# Patient Record
Sex: FEMALE | Race: WHITE | URBAN - METROPOLITAN AREA
[De-identification: names, ages, dates, MRNs, and addresses within clinical notes are randomized per-mention and may not be internally consistent; named-entity substitution may affect disease eponyms.]

---

## 2017-01-17 ENCOUNTER — OFFICE VISIT (OUTPATIENT)
Dept: FAMILY MEDICINE | Facility: CLINIC | Age: 32
End: 2017-01-17
Payer: COMMERCIAL

## 2017-01-17 VITALS
WEIGHT: 188 LBS | BODY MASS INDEX: 32.1 KG/M2 | DIASTOLIC BLOOD PRESSURE: 93 MMHG | HEART RATE: 101 BPM | SYSTOLIC BLOOD PRESSURE: 130 MMHG | HEIGHT: 64 IN | TEMPERATURE: 98.2 F

## 2017-01-17 DIAGNOSIS — M79.651 RIGHT THIGH PAIN: Primary | ICD-10-CM

## 2017-01-17 DIAGNOSIS — R51.9 FACIAL PAIN: ICD-10-CM

## 2017-01-17 DIAGNOSIS — Z00.00 ANNUAL VISIT FOR GENERAL ADULT MEDICAL EXAMINATION WITHOUT ABNORMAL FINDINGS: Primary | ICD-10-CM

## 2017-01-17 DIAGNOSIS — F32.1 MODERATE SINGLE CURRENT EPISODE OF MAJOR DEPRESSIVE DISORDER (H): ICD-10-CM
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
## 2017-01-17 LAB
ERYTHROCYTE [DISTWIDTH] IN BLOOD BY AUTOMATED COUNT: 12 % (ref 10–15)
HCT VFR BLD AUTO: 40.6 % (ref 35–47)
HGB BLD-MCNC: 13.8 G/DL (ref 11.7–15.7)
MCH RBC QN AUTO: 31.1 PG (ref 26.5–33)
MCHC RBC AUTO-ENTMCNC: 34 G/DL (ref 31.5–36.5)
MCV RBC AUTO: 91 FL (ref 78–100)
PLATELET # BLD AUTO: 308 10E9/L (ref 150–450)
RBC # BLD AUTO: 4.44 10E12/L (ref 3.8–5.2)
T3 SERPL-MCNC: 130 NG/DL (ref 60–181)
WBC # BLD AUTO: 5.7 10E9/L (ref 4–11)

## 2017-01-17 PROCEDURE — 99385 PREV VISIT NEW AGE 18-39: CPT | Performed by: INTERNAL MEDICINE

## 2017-01-17 PROCEDURE — 84480 ASSAY TRIIODOTHYRONINE (T3): CPT | Performed by: INTERNAL MEDICINE

## 2017-01-17 PROCEDURE — 86376 MICROSOMAL ANTIBODY EACH: CPT | Performed by: INTERNAL MEDICINE

## 2017-01-17 PROCEDURE — 84443 ASSAY THYROID STIM HORMONE: CPT | Performed by: INTERNAL MEDICINE

## 2017-01-17 PROCEDURE — 82306 VITAMIN D 25 HYDROXY: CPT | Performed by: INTERNAL MEDICINE

## 2017-01-17 PROCEDURE — 36415 COLL VENOUS BLD VENIPUNCTURE: CPT | Performed by: INTERNAL MEDICINE

## 2017-01-17 PROCEDURE — 84439 ASSAY OF FREE THYROXINE: CPT | Performed by: INTERNAL MEDICINE

## 2017-01-17 PROCEDURE — 99213 OFFICE O/P EST LOW 20 MIN: CPT | Performed by: INTERNAL MEDICINE

## 2017-01-17 PROCEDURE — 99212 OFFICE O/P EST SF 10 MIN: CPT | Mod: 25 | Performed by: INTERNAL MEDICINE

## 2017-01-17 PROCEDURE — 85027 COMPLETE CBC AUTOMATED: CPT | Performed by: INTERNAL MEDICINE

## 2017-01-17 RX ORDER — FLUOXETINE 10 MG/1
10 CAPSULE ORAL DAILY
Qty: 60 CAPSULE | Refills: 1 | Status: SHIPPED | OUTPATIENT
Start: 2017-01-17 | End: 2017-10-04

## 2017-01-17 ASSESSMENT — ANXIETY QUESTIONNAIRES
GAD7 TOTAL SCORE: 10
3. WORRYING TOO MUCH ABOUT DIFFERENT THINGS: MORE THAN HALF THE DAYS
5. BEING SO RESTLESS THAT IT IS HARD TO SIT STILL: SEVERAL DAYS
7. FEELING AFRAID AS IF SOMETHING AWFUL MIGHT HAPPEN: SEVERAL DAYS
IF YOU CHECKED OFF ANY PROBLEMS ON THIS QUESTIONNAIRE, HOW DIFFICULT HAVE THESE PROBLEMS MADE IT FOR YOU TO DO YOUR WORK, TAKE CARE OF THINGS AT HOME, OR GET ALONG WITH OTHER PEOPLE: SOMEWHAT DIFFICULT
1. FEELING NERVOUS, ANXIOUS, OR ON EDGE: SEVERAL DAYS
2. NOT BEING ABLE TO STOP OR CONTROL WORRYING: MORE THAN HALF THE DAYS
6. BECOMING EASILY ANNOYED OR IRRITABLE: MORE THAN HALF THE DAYS

## 2017-01-17 ASSESSMENT — PATIENT HEALTH QUESTIONNAIRE - PHQ9: 5. POOR APPETITE OR OVEREATING: SEVERAL DAYS

## 2017-01-17 NOTE — MR AVS SNAPSHOT
After Visit Summary   1/17/2017    Lyla Navarrete    MRN: [id number]           Patient Information     Date Of Birth          1985        Visit Information        Provider Department      1/17/2017 3:00 PM Marlyn Adams MD [facility]        Today's Diagnoses     Annual visit for general adult medical examination without abnormal findings    -  1     Moderate single current episode of major depressive disorder (H)           Care Instructions      Preventive Health Recommendations  Female Ages 26 - 39  Yearly exam:   See your health care provider every year in order to    Review health changes.     Discuss preventive care.      Review your medicines if you your doctor has prescribed any.    Until age 30: Get a Pap test every three years (more often if you have had an abnormal result).    After age 30: Talk to your doctor about whether you should have a Pap test every 3 years or have a Pap test with HPV screening every 5 years.   You do not need a Pap test if your uterus was removed (hysterectomy) and you have not had cancer.  You should be tested each year for STDs (sexually transmitted diseases), if you're at risk.   Talk to your provider about how often to have your cholesterol checked.  If you are at risk for diabetes, you should have a diabetes test (fasting glucose).  Shots: Get a flu shot each year. Get a tetanus shot every 10 years.   Nutrition:     Eat at least 5 servings of fruits and vegetables each day.    Eat whole-grain bread, whole-wheat pasta and brown rice instead of white grains and rice.    Talk to your provider about Calcium and Vitamin D.     Lifestyle    Exercise at least 150 minutes a week (30 minutes a day, 5 days of the week). This will help you control your weight and prevent disease.    Limit alcohol to one drink per day.    No smoking.     Wear sunscreen to prevent skin cancer.    See your dentist every six months for an exam and cleaning.             "Follow-ups after your visit        Who to contact     If you have questions or need follow up information about today's clinic visit or your schedule please contact [facility] [place] directly at [phone].  Normal or non-critical lab and imaging results will be communicated to you by MyChart, letter or phone within 4 business days after the clinic has received the results. If you do not hear from us within 7 days, please contact the clinic through MyChart or phone. If you have a critical or abnormal lab result, we will notify you by phone as soon as possible.  Submit refill requests through [employer] or call your pharmacy and they will forward the refill request to us. Please allow 3 business days for your refill to be completed.          Additional Information About Your Visit        MyCharSTYLHUNT Information     Drizly lets you send messages to your doctor, view your test results, renew your prescriptions, schedule appointments and more. To sign up, go to [email or web address] . Click on \"Log in\" on the left side of the screen, which will take you to the Welcome page. Then click on \"Sign up Now\" on the right side of the page.     You will be asked to enter the access code listed below, as well as some personal information. Please follow the directions to create your username and password.     Your access code is: [email or web address]  Expires: 2017  3:40 PM     Your access code will  in 90 days. If you need help or a new code, please call your [place] clinic or [phone].        Care EveryWhere ID     This is your Care EveryWhere ID. This could be used by other organizations to access your [place] medical records  [id number]        Your Vitals Were     Pulse Temperature Height BMI (Body Mass Index) Last Period       101 98.2  F (36.8  C) (Oral) 5' 3.78\" (1.62 m) 32.49 kg/m2 2017        Blood Pressure from Last 3 Encounters:   17 130/93    Weight from Last 3 Encounters:   17 " 188 lb (85.276 kg)              We Performed the Following     CBC with platelets     T3, total     T4, free     Thyroid peroxidase antibody     TSH with free T4 reflex     Vitamin D Deficiency          Today's Medication Changes          These changes are accurate as of: 1/17/17  3:40 PM.  If you have any questions, ask your nurse or doctor.               Start taking these medicines.        Dose/Directions    FLUoxetine 10 MG capsule   Commonly known as:  PROzac   Used for:  Moderate single current episode of major depressive disorder (H)   Started by:  Marlyn Adams MD        Dose:  10 mg   Take 1 capsule (10 mg) by mouth daily X 14 days and then increase to 2 capsules (20 mg) daily.   Quantity:  60 capsule   Refills:  1            Where to get your medicines      These medications were [place] [facility] [place]  [address] [facility] [address]     Phone:  [phone]    - FLUoxetine 10 MG capsule             Primary Care Provider    None Specified       No primary provider on file.        Thank you!     Thank you for choosing [facility]  for your care. Our goal is always to provide you with excellent care. Hearing back from our patients is one way we can continue to improve our services. Please take a few minutes to complete the written survey that you may receive in the mail after your visit with us. Thank you!             Your Updated Medication List - Protect others around you: Learn how to safely use, store and throw away your medicines at www.disposemymeds.org.          This list is accurate as of: 1/17/17  3:40 PM.  Always use your most recent med list.                   Brand Name Dispense Instructions for use    DIAZEPAM PO      Take 5 mg by mouth       FLUoxetine 10 MG capsule    PROzac    60 capsule    Take 1 capsule (10 mg) by mouth daily X 14 days and then increase to 2 capsules (20 mg) daily.        PERCOCET PO

## 2017-01-17 NOTE — PROGRESS NOTES
SUBJECTIVE:     CC: Lyla Navarrete is an 31 year old woman who presents for preventive health visit.     Healthy Habits:    Do you get at least three servings of calcium containing foods daily (dairy, green leafy vegetables, etc.)? yes    Amount of exercise or daily activities, outside of work:  Sometimes     Problems taking medications regularly No    Medication side effects: No    Have you had an eye exam in the past two years? no    Do you see a dentist twice per year? yes  Do you have sleep apnea, excessive snoring or daytime drowsiness?no  Struggles with some depression and anxiety. Mom takes Zoloft. At least for the past year she has been struggling with a depressed mood. Her brother passed away 2 years ago.      Today's PHQ-2 Score:   PHQ-2 ( 1999 Pfizer) 1/17/2017   Q1: Little interest or pleasure in doing things 2   Q2: Feeling down, depressed or hopeless 2   PHQ-2 Score 4       Abuse: Current or Past(Physical, Sexual or Emotional)- No  Do you feel safe in your environment - Yes    Social History   Substance Use Topics     Smoking status: Never Smoker      Smokeless tobacco: Never Used     Alcohol Use: No     The patient does not drink >3 drinks per day nor >7 drinks per week.    No results for input(s): CHOL, HDL, LDL, TRIG, CHOLHDLRATIO, NHDL in the last 36238 hours.    Reviewed orders with patient.  Reviewed health maintenance and updated orders accordingly - Yes    Mammo Decision Support:  Mammogram not appropriate for this patient based on age.    Pertinent mammograms are reviewed under the imaging tab.  History of abnormal Pap smear: NO - age 21-29 PAP every 3 years recommended  All Histories reviewed and updated in Epic.      ROS:   ROS: 10 point ROS neg other than the symptoms noted above in the HPI.      Problem list, Medication list, Allergies, and Medical/Social/Surgical histories reviewed in [facility] and updated as appropriate.  OBJECTIVE:     /92 mmHg  Pulse 59  Temp(Src) 98.2  F (36.8  " C) (Oral)  Ht 5' 3.78\" (1.62 m)  Wt 188 lb (85.276 kg)  BMI 32.49 kg/m2  LMP 01/14/2017  EXAM:  GENERAL: healthy, alert and no distress  EYES: Eyes grossly normal to inspection, PERRL and conjunctivae and sclerae normal  HENT: ear canals and TM's normal, nose and mouth without ulcers or lesions  NECK: no adenopathy, no asymmetry, masses, or scars and thyroid normal to palpation  RESP: lungs clear to auscultation - no rales, rhonchi or wheezes  BREAST: normal without masses, tenderness or nipple discharge and no palpable axillary masses or adenopathy  CV: regular rate and rhythm, normal S1 S2, no S3 or S4, no murmur, click or rub, no peripheral edema and peripheral pulses strong  ABDOMEN: soft, nontender, no hepatosplenomegaly, no masses and bowel sounds normal  MS: no gross musculoskeletal defects noted, no edema  SKIN: no suspicious lesions or rashes  NEURO: Normal strength and tone, mentation intact and speech normal  PSYCH: mentation appears normal, affect normal/bright    ASSESSMENT/PLAN:     1. Annual visit for general adult medical examination without abnormal findings  - CBC with platelets    2. Moderate single current episode of major depressive disorder (H)  - FLUoxetine (PROZAC) 10 MG capsule; Take 1 capsule (10 mg) by mouth daily X 14 days and then increase to 2 capsules (20 mg) daily.  Dispense: 60 capsule; Refill: 1  - Follow up in 4 weeks.   - TSH with free T4 reflex  - Vitamin D Deficiency  - T4, free  - T3, total  - Thyroid peroxidase antibody  - CBC with platelets      COUNSELING:   Reviewed preventive health counseling, as reflected in patient instructions       Regular exercise       Healthy diet/nutrition       Vision screening       Hearing screening       Contraception       Family planning       Osteoporosis Prevention/Bone Health         reports that she has never smoked. She has never used smokeless tobacco.    Estimated body mass index is 32.49 kg/(m^2) as calculated from the " "following:    Height as of this encounter: 5' 3.78\" (1.62 m).    Weight as of this encounter: 188 lb (85.276 kg).   Weight management plan: Discussed healthy diet and exercise guidelines and patient will follow up in 3 months in clinic to re-evaluate.    Counseling Resources:  ATP IV Guidelines  Pooled Cohorts Equation Calculator  Breast Cancer Risk Calculator  FRAX Risk Assessment  ICSI Preventive Guidelines  Dietary Guidelines for Americans, 2010  USDA's MyPlate  ASA Prophylaxis  Lung CA Screening    Marlyn Adams MD  [facility] [place] [facility]  "

## 2017-01-17 NOTE — Clinical Note
Please abstract the following data from this visit with this patient into the appropriate field in Epic:  Pap smear done on this date: 10/2015 (approximately), by this group: [facility]  results were normal .

## 2017-01-18 LAB
DEPRECATED CALCIDIOL+CALCIFEROL SERPL-MC: 26 UG/L (ref 20–75)
T4 FREE SERPL-MCNC: 1.06 NG/DL (ref 0.76–1.46)
TSH SERPL DL<=0.005 MIU/L-ACNC: 2.33 MU/L (ref 0.4–4)

## 2017-01-18 ASSESSMENT — ANXIETY QUESTIONNAIRES: GAD7 TOTAL SCORE: 10

## 2017-01-18 ASSESSMENT — PATIENT HEALTH QUESTIONNAIRE - PHQ9: SUM OF ALL RESPONSES TO PHQ QUESTIONS 1-9: 16

## 2017-01-19 LAB — THYROPEROXIDASE AB SERPL-ACNC: <10 IU/ML

## 2017-01-27 VITALS
DIASTOLIC BLOOD PRESSURE: 92 MMHG | BODY MASS INDEX: 32.1 KG/M2 | SYSTOLIC BLOOD PRESSURE: 132 MMHG | HEART RATE: 59 BPM | WEIGHT: 188 LBS | HEIGHT: 64 IN | TEMPERATURE: 98.2 F
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
## 2017-02-21 ENCOUNTER — TELEPHONE (OUTPATIENT)
Dept: FAMILY MEDICINE | Facility: CLINIC | Age: 32
End: 2017-02-21

## 2017-02-21 NOTE — TELEPHONE ENCOUNTER
Attending Physician's Report from [employer]  Placed in MD bin for review and signature  CASI came with to be mailed back to [employer]  Nuha Madrigal TC

## 2017-03-01 NOTE — TELEPHONE ENCOUNTER
Forms signed by Marlyn Adams MD   and they were faxed to Beulah Pacheco  [phone] (JULIANEE was not with form when TC got it back) on March 1, 2017  Copy Sent to abstracting for scanning.  Nuha CHAVES

## 2017-10-04 ENCOUNTER — OFFICE VISIT (OUTPATIENT)
Dept: FAMILY MEDICINE | Facility: CLINIC | Age: 32
End: 2017-10-04
Payer: COMMERCIAL

## 2017-10-04 VITALS
OXYGEN SATURATION: 100 % | HEART RATE: 80 BPM | TEMPERATURE: 98.7 F | WEIGHT: 184 LBS | SYSTOLIC BLOOD PRESSURE: 118 MMHG | DIASTOLIC BLOOD PRESSURE: 76 MMHG | BODY MASS INDEX: 31.41 KG/M2 | HEIGHT: 64 IN

## 2017-10-04 DIAGNOSIS — Z12.4 SCREENING FOR CERVICAL CANCER: Primary | ICD-10-CM

## 2017-10-04 DIAGNOSIS — N89.8 VAGINAL DISCHARGE: ICD-10-CM

## 2017-10-04 LAB
SPECIMEN SOURCE: NORMAL
WET PREP SPEC: NORMAL

## 2017-10-04 PROCEDURE — 87624 HPV HI-RISK TYP POOLED RSLT: CPT | Performed by: PHYSICIAN ASSISTANT

## 2017-10-04 PROCEDURE — G0145 SCR C/V CYTO,THINLAYER,RESCR: HCPCS | Performed by: PHYSICIAN ASSISTANT

## 2017-10-04 PROCEDURE — 99213 OFFICE O/P EST LOW 20 MIN: CPT | Performed by: PHYSICIAN ASSISTANT

## 2017-10-04 PROCEDURE — 87210 SMEAR WET MOUNT SALINE/INK: CPT | Performed by: PHYSICIAN ASSISTANT

## 2017-10-04 NOTE — MR AVS SNAPSHOT
After Visit Summary   10/4/2017    Lyla Navarrete    MRN: [id number]           Patient Information     Date Of Birth          1985        Visit Information        Provider Department      10/4/2017 10:40 AM Rianna Proctor PA-C [facility] [place]        Today's Diagnoses     Screening for cervical cancer    -  1    Vaginal discharge          Care Instructions      Preventive Health Recommendations  Female Ages 26 - 39  Yearly exam:   See your health care provider every year in order to    Review health changes.     Discuss preventive care.      Review your medicines if you your doctor has prescribed any.    Until age 30: Get a Pap test every three years (more often if you have had an abnormal result).    After age 30: Talk to your doctor about whether you should have a Pap test every 3 years or have a Pap test with HPV screening every 5 years.   You do not need a Pap test if your uterus was removed (hysterectomy) and you have not had cancer.  You should be tested each year for STDs (sexually transmitted diseases), if you're at risk.   Talk to your provider about how often to have your cholesterol checked.  If you are at risk for diabetes, you should have a diabetes test (fasting glucose).  Shots: Get a flu shot each year. Get a tetanus shot every 10 years.   Nutrition:     Eat at least 5 servings of fruits and vegetables each day.    Eat whole-grain bread, whole-wheat pasta and brown rice instead of white grains and rice.    Talk to your provider about Calcium and Vitamin D.     Lifestyle    Exercise at least 150 minutes a week (30 minutes a day, 5 days of the week). This will help you control your weight and prevent disease.    Limit alcohol to one drink per day.    No smoking.     Wear sunscreen to prevent skin cancer.    See your dentist every six months for an exam and cleaning.            Follow-ups after your visit        Who to contact     If you have questions or need follow up  "information about today's clinic visit or your schedule please contact [facility] directly at [phone].  Normal or non-critical lab and imaging results will be communicated to you by MyChart, letter or phone within 4 business days after the clinic has received the results. If you do not hear from us within 7 days, please contact the clinic through [facility] or phone. If you have a critical or abnormal lab result, we will notify you by phone as soon as possible.  Submit refill requests through [employer] or call your pharmacy and they will forward the refill request to us. Please allow 3 business days for your refill to be completed.          Additional Information About Your Visit        MyChart Information     [employer] lets you send messages to your doctor, view your test results, renew your prescriptions, schedule appointments and more. To sign up, go to [email or web address] . Click on \"Log in\" on the left side of the screen, which will take you to the Welcome page. Then click on \"Sign up Now\" on the right side of the page.     You will be asked to enter the access code listed below, as well as some personal information. Please follow the directions to create your username and password.     Your access code is: [email or web address]  Expires: 1/3/2018  4:48 PM     Your access code will  in 90 days. If you need help or a new code, please call your [place] clinic or [phone].        Care EveryWhere ID     This is your Care EveryWhere ID. This could be used by other organizations to access your [place] medical records  [id number]        Your Vitals Were     Pulse Temperature Height Last Period Pulse Oximetry Breastfeeding?    80 98.7  F (37.1  C) (Oral) 5' 3.78\" (1.62 m) 2017 (Approximate) 100% No    BMI (Body Mass Index)                   31.8 kg/m2            Blood Pressure from Last 3 Encounters:   10/04/17 118/76   17 (!) 130/93   17 (!) 132/92    Weight from Last 3 " Encounters:   10/04/17 184 lb (83.5 kg)   01/17/17 188 lb (85.3 kg)   01/27/17 188 lb (85.3 kg)              We Performed the Following     HPV High Risk Types DNA Cervical     Pap imaged thin layer screen with HPV - recommended age 30 - 65 years (select HPV order below)     Wet prep        Primary Care Provider Office Phone # Fax #    Marlyn Adams [phone]       [address]  [place] [address]        Equal Access to Services     [facility]: Hadii aad ku hadasho Soomaali, waaxda luqadaha, qaybta kaalmada adeegyada, waxjúnior muir . So [facility] [phone].    ATENCIÓN: Si habla español, tiene a urrutia disposición servicios gratuitos de asistencia lingüística. Llame al [phone].    We comply with applicable federal civil rights laws and [place] laws. We do not discriminate on the basis of race, color, national origin, age, disability, sex, sexual orientation, or gender identity.            Thank you!     Thank you for choosing [facility]  for your care. Our goal is always to provide you with excellent care. Hearing back from our patients is one way we can continue to improve our services. Please take a few minutes to complete the written survey that you may receive in the mail after your visit with us. Thank you!             Your Updated Medication List - Protect others around you: Learn how to safely use, store and throw away your medicines at www.disposemymeds.org.      Notice  As of 10/4/2017 11:59 PM    You have not been prescribed any medications.

## 2017-10-04 NOTE — LETTER
October 13, 2017    Lyla Navarrete  [address] [address]    Dear Lyla,  We are happy to inform you that your PAP smear result from 10/04/17 is normal.  We are now able to do a follow up test on PAP smears. The DNA test is for HPV (Human Papilloma Virus). Cervical cancer is closely linked with certain types of HPV. Your result showed no evidence of high risk HPV.  Therefore we recommend you return in 3 years for your next pap smear.  You will still need to return to the clinic every year for an annual exam and other preventive tests.  Please contact the clinic at [phone] with any questions.  Sincerely,    Rianna Proctor PA-C/[place]

## 2017-10-04 NOTE — PROGRESS NOTES
Note to staff: Please send a result letter    The results from your recent lab work are within normal limits.    - The vaginal wet prep was normal       Thank you for choosing [facility] for your health care needs,      Rianna Proctor PA-C

## 2017-10-04 NOTE — PROGRESS NOTES
"  SUBJECTIVE:                                                    Lyla Navarrete is a 32 year old female who presents to clinic today for the following health issues:      Pt here for Pap only -- states last was 3 years ago at an Urgent Care.     Patient denies any concerns today.  She is trying to get pregnant and they have been trying since March 2017.      Problem list and histories reviewed & adjusted, as indicated.  Additional history: as documented      ROS:  Constitutional, HEENT, cardiovascular, pulmonary, GI, , musculoskeletal, neuro, skin, endocrine and psych systems are negative, except as otherwise noted.    OBJECTIVE:                                                    /76 (BP Location: Left arm, Patient Position: Chair, Cuff Size: Adult Regular)  Pulse 80  Temp 98.7  F (37.1  C) (Oral)  Ht 5' 3.78\" (1.62 m)  Wt 184 lb (83.5 kg)  LMP 09/07/2017 (Approximate)  SpO2 100%  Breastfeeding? No  BMI 31.8 kg/m2  Body mass index is 31.8 kg/(m^2).  GENERAL: healthy, alert and no distress  EYES: Eyes grossly normal to inspection, PERRL and conjunctivae and sclerae normal  ABDOMEN: soft, nontender, no hepatosplenomegaly, no masses and bowel sounds normal   (female): normal female external genitalia, normal urethral meatus, vaginal mucosa, normal cervix/adnexa/uterus without masses or discharge  MS: no gross musculoskeletal defects noted, no edema  NEURO: Normal strength and tone, mentation intact and speech normal  PSYCH: mentation appears normal, affect normal/bright    Diagnostic Test Results:  Results for orders placed or performed in visit on 10/04/17   Wet prep   Result Value Ref Range    Specimen Description Vagina     Wet Prep No clue cells seen     Wet Prep No yeast seen     Wet Prep No Trichomonas seen         ASSESSMENT/PLAN:                                                      Lyla was seen today for gyn exam.    Diagnoses and all orders for this visit:    Screening for cervical cancer  - "     Pap imaged thin layer screen with HPV - recommended age 30 - 65 years (select HPV order below)  -     HPV High Risk Types DNA Cervical    Vaginal discharge  -     Wet prep      - Patient advised to followup with any concerns regarding trying to conceive.      -- Patient agrees with and understands the plan today.      See Patient Instructions        Rianna Proctor PA-C    [facility] [place]

## 2017-10-04 NOTE — NURSING NOTE
"Chief Complaint   Patient presents with     Gyn Exam       Initial /76 (BP Location: Left arm, Patient Position: Chair, Cuff Size: Adult Regular)  Pulse 80  Temp 98.7  F (37.1  C) (Oral)  Ht 5' 3.78\" (1.62 m)  Wt 184 lb (83.5 kg)  LMP 09/07/2017 (Approximate)  SpO2 100%  Breastfeeding? No  BMI 31.8 kg/m2 Estimated body mass index is 31.8 kg/(m^2) as calculated from the following:    Height as of this encounter: 5' 3.78\" (1.62 m).    Weight as of this encounter: 184 lb (83.5 kg).  Medication Reconciliation: complete   Csaba Mlnarik CMA    "

## 2017-10-04 NOTE — LETTER
[address] [place] [address]                  [phone]   October 4, 2017    Lyla Navarrete  [address] [facility] [address]      Dear Lyla,    Here is a summary of your recent test results:    - The vaginal wet prep was normal       Your test results are enclosed.      Please contact me if you have any questions.    In addition, here is a list of due or overdue Health Maintenance reminders.    Health Maintenance Due   Topic Date Due     Tetanus Vaccine - every 10 years  09/11/2005     Flu Vaccine - yearly  09/01/2017       Please call us at [phone] (or use "[employer] to address the above recommendations.            Thank you very much for trusting [facility]..     Healthy regards,      Rianna Proctor PA-C        Results for orders placed or performed in visit on 10/04/17   Wet prep   Result Value Ref Range    Specimen Description Vagina     Wet Prep No clue cells seen     Wet Prep No yeast seen     Wet Prep No Trichomonas seen

## 2017-10-06 LAB
COPATH REPORT: NORMAL
PAP: NORMAL

## 2017-10-10 LAB
FINAL DIAGNOSIS: NORMAL
HPV HR 12 DNA CVX QL NAA+PROBE: NEGATIVE
HPV16 DNA SPEC QL NAA+PROBE: NEGATIVE
HPV18 DNA SPEC QL NAA+PROBE: NEGATIVE
SPECIMEN DESCRIPTION: NORMAL

## 2017-10-31 DIAGNOSIS — Z32.01 PREGNANCY TEST POSITIVE: Primary | ICD-10-CM

## 2017-11-09 ENCOUNTER — PRENATAL OFFICE VISIT (OUTPATIENT)
Dept: NURSING | Facility: CLINIC | Age: 32
End: 2017-11-09
Payer: COMMERCIAL

## 2017-11-09 DIAGNOSIS — Z32.01 PREGNANCY TEST POSITIVE: Primary | ICD-10-CM

## 2017-11-09 LAB
ABO + RH BLD: NORMAL
ABO + RH BLD: NORMAL
BETA HCG QUAL IFA URINE: POSITIVE
BLD GP AB SCN SERPL QL: NORMAL
BLOOD BANK CMNT PATIENT-IMP: NORMAL
ERYTHROCYTE [DISTWIDTH] IN BLOOD BY AUTOMATED COUNT: 12.4 % (ref 10–15)
HCT VFR BLD AUTO: 36.1 % (ref 35–47)
HGB BLD-MCNC: 12 G/DL (ref 11.7–15.7)
MCH RBC QN AUTO: 31.2 PG (ref 26.5–33)
MCHC RBC AUTO-ENTMCNC: 33.2 G/DL (ref 31.5–36.5)
MCV RBC AUTO: 94 FL (ref 78–100)
PLATELET # BLD AUTO: 263 10E9/L (ref 150–450)
RBC # BLD AUTO: 3.85 10E12/L (ref 3.8–5.2)
SPECIMEN EXP DATE BLD: NORMAL
WBC # BLD AUTO: 6.3 10E9/L (ref 4–11)

## 2017-11-09 PROCEDURE — 86900 BLOOD TYPING SEROLOGIC ABO: CPT | Performed by: OBSTETRICS & GYNECOLOGY

## 2017-11-09 PROCEDURE — 86850 RBC ANTIBODY SCREEN: CPT | Performed by: OBSTETRICS & GYNECOLOGY

## 2017-11-09 PROCEDURE — 99207 ZZC NO CHARGE NURSE ONLY: CPT

## 2017-11-09 PROCEDURE — 87086 URINE CULTURE/COLONY COUNT: CPT | Performed by: OBSTETRICS & GYNECOLOGY

## 2017-11-09 PROCEDURE — 87389 HIV-1 AG W/HIV-1&-2 AB AG IA: CPT | Performed by: OBSTETRICS & GYNECOLOGY

## 2017-11-09 PROCEDURE — 87340 HEPATITIS B SURFACE AG IA: CPT | Performed by: OBSTETRICS & GYNECOLOGY

## 2017-11-09 PROCEDURE — 36415 COLL VENOUS BLD VENIPUNCTURE: CPT | Performed by: OBSTETRICS & GYNECOLOGY

## 2017-11-09 PROCEDURE — 86901 BLOOD TYPING SEROLOGIC RH(D): CPT | Performed by: OBSTETRICS & GYNECOLOGY

## 2017-11-09 PROCEDURE — 86762 RUBELLA ANTIBODY: CPT | Performed by: OBSTETRICS & GYNECOLOGY

## 2017-11-09 PROCEDURE — 85027 COMPLETE CBC AUTOMATED: CPT | Performed by: OBSTETRICS & GYNECOLOGY

## 2017-11-09 PROCEDURE — 84703 CHORIONIC GONADOTROPIN ASSAY: CPT | Performed by: OBSTETRICS & GYNECOLOGY

## 2017-11-09 PROCEDURE — 86780 TREPONEMA PALLIDUM: CPT | Performed by: OBSTETRICS & GYNECOLOGY

## 2017-11-09 RX ORDER — PRENATAL VIT/IRON FUM/FOLIC AC 27MG-0.8MG
1 TABLET ORAL DAILY
Qty: 100 TABLET | Refills: 3 | COMMUNITY
Start: 2017-11-09

## 2017-11-09 NOTE — MR AVS SNAPSHOT
"              After Visit Summary   11/9/2017    Lyla Navarrete    MRN: [id number]           Patient Information     Date Of Birth          1985        Visit Information        Provider Department      11/9/2017 9:00 AM RI PRENATAL NURSE [facility]        Today's Diagnoses     Pregnancy test positive    -  1       Follow-ups after your visit        Your next 10 appointments already scheduled     Nov 20, 2017  2:45 PM CST   New Prenatal with Nalini Concepcion, DO   [facility] ([facility])    [address]  [facility] [address]   [phone]              Future tests that were ordered for you today     Open Future Orders        Priority Expected Expires Ordered    US OB <14 Weeks w Transvaginal Single Routine  11/9/2018 11/9/2017            Who to contact     If you have questions or need follow up information about today's clinic visit or your schedule please contact [facility] directly at [phone].  Normal or non-critical lab and imaging results will be communicated to you by MyChart, letter or phone within 4 business days after the clinic has received the results. If you do not hear from us within 7 days, please contact the clinic through [employer] or phone. If you have a critical or abnormal lab result, we will notify you by phone as soon as possible.  Submit refill requests through [employer] or call your pharmacy and they will forward the refill request to us. Please allow 3 business days for your refill to be completed.          Additional Information About Your Visit        [employer] Information     [employer] lets you send messages to your doctor, view your test results, renew your prescriptions, schedule appointments and more. To sign up, go to [email or web address] . Click on \"Log in\" on the left side of the screen, which will take you to the Welcome page. Then click on \"Sign up Now\" on the right side of the page.     You will " be asked to enter the access code listed below, as well as some personal information. Please follow the directions to create your username and password.     Your access code is: [email or web address]  Expires: 1/3/2018  3:48 PM     Your access code will  in 90 days. If you need help or a new code, please call your [facility] clinic or [phone].        Care EveryWhere ID     This is your Care EveryWhere ID. This could be used by other organizations to access your [facility] medical records  [id number]        Your Vitals Were     Last Period                   2017 (Exact Date)            Blood Pressure from Last 3 Encounters:   10/04/17 118/76   17 (!) 130/93   17 (!) 132/92    Weight from Last 3 Encounters:   10/04/17 184 lb (83.5 kg)   17 188 lb (85.3 kg)   17 188 lb (85.3 kg)              We Performed the Following     ABO/Rh type and screen     Anti Treponema     Beta HCG qual IFA urine - [facility] and [place]     CBC with platelets     Hepatitis B surface antigen     HIV Antigen Antibody Combo     Rubella Antibody IgG Quantitative     Urine Culture Aerobic Bacterial        Primary Care Provider Office Phone # Fax #    Marlyn Adams [phone]       [address]  [place] [address]        Equal Access to Services     [facility] AH: Hadii aad ku hadasho Soomaali, waaxda luqadaha, qaybta kaalmada adeegyada, kaiser cardona hayjosefa muir . So [facility] [phone].    ATENCIÓN: Si habla español, tiene a urrutia disposición servicios gratuitos de asistencia lingüística. Stanley al [phone].    We comply with applicable federal civil rights laws and [place] laws. We do not discriminate on the basis of race, color, national origin, age, disability, sex, sexual orientation, or gender identity.            Thank you!     Thank you for choosing [facility]  for your care. Our goal is always to provide you with excellent care. Hearing back from  our patients is one way we can continue to improve our services. Please take a few minutes to complete the written survey that you may receive in the mail after your visit with us. Thank you!             Your Updated Medication List - Protect others around you: Learn how to safely use, store and throw away your medicines at www.disposemymeds.org.          This list is accurate as of: 11/9/17 10:25 AM.  Always use your most recent med list.                   Brand Name Dispense Instructions for use Diagnosis    prenatal multivitamin plus iron 27-0.8 MG Tabs per tablet     100 tablet    Take 1 tablet by mouth daily

## 2017-11-09 NOTE — NURSING NOTE
NPN nurse visit. 1st dr visit scheduled for 11/20/17 with Nalini Concepcion D.O Pap due. Last pap 10/2017.  9w1d.   FRANDY Bone RN

## 2017-11-10 LAB
BACTERIA SPEC CULT: NORMAL
HBV SURFACE AG SERPL QL IA: NONREACTIVE
HIV 1+2 AB+HIV1 P24 AG SERPL QL IA: NONREACTIVE
RUBV IGG SERPL IA-ACNC: 31 IU/ML
SPECIMEN SOURCE: NORMAL
T PALLIDUM IGG+IGM SER QL: NEGATIVE

## 2017-11-14 ENCOUNTER — HOSPITAL ENCOUNTER (OUTPATIENT)
Dept: ULTRASOUND IMAGING | Facility: CLINIC | Age: 32
Discharge: HOME | End: 2017-11-14
Attending: OBSTETRICS & GYNECOLOGY | Admitting: OBSTETRICS & GYNECOLOGY
Payer: COMMERCIAL

## 2017-11-14 DIAGNOSIS — Z32.01 PREGNANCY TEST POSITIVE: ICD-10-CM

## 2017-11-14 PROCEDURE — 76801 OB US < 14 WKS SINGLE FETUS: CPT

## 2017-11-20 ENCOUNTER — PRENATAL OFFICE VISIT (OUTPATIENT)
Dept: OBGYN | Facility: CLINIC | Age: 32
End: 2017-11-20
Payer: COMMERCIAL

## 2017-11-20 VITALS
WEIGHT: 188 LBS | SYSTOLIC BLOOD PRESSURE: 110 MMHG | HEIGHT: 63 IN | DIASTOLIC BLOOD PRESSURE: 70 MMHG | BODY MASS INDEX: 33.31 KG/M2 | HEART RATE: 84 BPM

## 2017-11-20 DIAGNOSIS — Z34.01 ENCOUNTER FOR SUPERVISION OF NORMAL FIRST PREGNANCY IN FIRST TRIMESTER: Primary | ICD-10-CM

## 2017-11-20 DIAGNOSIS — Z11.3 SCREEN FOR STD (SEXUALLY TRANSMITTED DISEASE): ICD-10-CM

## 2017-11-20 DIAGNOSIS — Z87.09 HISTORY OF ASTHMA: ICD-10-CM

## 2017-11-20 DIAGNOSIS — Z87.42 H/O ABNORMAL CERVICAL PAPANICOLAOU SMEAR: ICD-10-CM

## 2017-11-20 PROCEDURE — 40000791 ZZHCL STATISTIC VERIFI PRENATAL TRISOMY 21,18,13: Mod: 90 | Performed by: OBSTETRICS & GYNECOLOGY

## 2017-11-20 PROCEDURE — 99207 ZZC FIRST OB VISIT: CPT | Performed by: OBSTETRICS & GYNECOLOGY

## 2017-11-20 PROCEDURE — 87591 N.GONORRHOEAE DNA AMP PROB: CPT | Performed by: OBSTETRICS & GYNECOLOGY

## 2017-11-20 PROCEDURE — 87491 CHLMYD TRACH DNA AMP PROBE: CPT | Performed by: OBSTETRICS & GYNECOLOGY

## 2017-11-20 PROCEDURE — 99000 SPECIMEN HANDLING OFFICE-LAB: CPT | Performed by: OBSTETRICS & GYNECOLOGY

## 2017-11-20 PROCEDURE — 36415 COLL VENOUS BLD VENIPUNCTURE: CPT | Performed by: OBSTETRICS & GYNECOLOGY

## 2017-11-20 NOTE — MR AVS SNAPSHOT
"              After Visit Summary   2017    Lyla Navarrete    MRN: [id number]           Patient Information     Date Of Birth          1985        Visit Information        Provider Department      2017 2:45 PM Nalini Concepcion DO [facility]        Today's Diagnoses     Encounter for supervision of normal first pregnancy in first trimester    -  1    History of asthma        H/O abnormal cervical Papanicolaou smear        Screen for STD (sexually transmitted disease)           Follow-ups after your visit        Who to contact     If you have questions or need follow up information about today's clinic visit or your schedule please contact [facility] directly at [phone].  Normal or non-critical lab and imaging results will be communicated to you by MyChart, letter or phone within 4 business days after the clinic has received the results. If you do not hear from us within 7 days, please contact the clinic through MyChart or phone. If you have a critical or abnormal lab result, we will notify you by phone as soon as possible.  Submit refill requests through [employer] or call your pharmacy and they will forward the refill request to us. Please allow 3 business days for your refill to be completed.          Additional Information About Your Visit        MyChart Information     [employer] lets you send messages to your doctor, view your test results, renew your prescriptions, schedule appointments and more. To sign up, go to [email or web address] . Click on \"Log in\" on the left side of the screen, which will take you to the Welcome page. Then click on \"Sign up Now\" on the right side of the page.     You will be asked to enter the access code listed below, as well as some personal information. Please follow the directions to create your username and password.     Your access code is: [email or web address]  Expires: 1/3/2018  3:48 PM     Your access code will  in 90 days. If " "you need help or a new code, please call your [place] clinic or [phone].        Care EveryWhere ID     This is your Care EveryWhere ID. This could be used by other organizations to access your [place] medical records  [id number]        Your Vitals Were     Pulse Height Last Period Breastfeeding? BMI (Body Mass Index)       84 5' 3\" (1.6 m) 09/06/2017 (Exact Date) No 33.3 kg/m2        Blood Pressure from Last 3 Encounters:   11/20/17 110/70   10/04/17 118/76   01/17/17 (!) 130/93    Weight from Last 3 Encounters:   11/20/17 188 lb (85.3 kg)   10/04/17 184 lb (83.5 kg)   01/17/17 188 lb (85.3 kg)              We Performed the Following     CHLAMYDIA TRACHOMATIS PCR     NEISSERIA GONORRHOEA PCR     Non Invasive Prenatal Test Cell Free DNA        Primary Care Provider Office Phone # Fax #    Marlyn Adams [phone]       [address]  [place] [address]        Equal Access to Services     [facility]: Hadii aad ku hadasho Soomaali, waaxda luqadaha, qaybta kaalmada adeegingrid, [facility]aiser muir . So [facility] [phone].    ATENCIÓN: Si habla español, tiene a urrutia disposición servicios gratuitos de asistencia lingüística. Stanley al [phone].    We comply with applicable federal civil rights laws and [place] laws. We do not discriminate on the basis of race, color, national origin, age, disability, sex, sexual orientation, or gender identity.            Thank you!     Thank you for choosing [facility]  for your care. Our goal is always to provide you with excellent care. Hearing back from our patients is one way we can continue to improve our services. Please take a few minutes to complete the written survey that you may receive in the mail after your visit with us. Thank you!             Your Updated Medication List - Protect others around you: Learn how to safely use, store and throw away your medicines at www.disposemymeds.org.        "   This list is accurate as of: 11/20/17 11:59 PM.  Always use your most recent med list.                   Brand Name Dispense Instructions for use Diagnosis    prenatal multivitamin plus iron 27-0.8 MG Tabs per tablet     100 tablet    Take 1 tablet by mouth daily

## 2017-11-20 NOTE — NURSING NOTE
"Chief Complaint   Patient presents with     Prenatal Care       Initial /70 (BP Location: Left arm, Patient Position: Sitting, Cuff Size: Adult Regular)  Pulse 84  Ht 5' 3\" (1.6 m)  Wt 188 lb (85.3 kg)  LMP 09/06/2017 (Exact Date)  Breastfeeding? No  BMI 33.3 kg/m2 Estimated body mass index is 33.3 kg/(m^2) as calculated from the following:    Height as of this encounter: 5' 3\" (1.6 m).    Weight as of this encounter: 188 lb (85.3 kg).  Medication Reconciliation: complete     10w5d  Lab Results   Component Value Date    PAP NIL 10/04/2017     + nausea and vomiting  + mild cramping  - bleeding  + increased headache   + fatigue  Marry Tay LPN      "

## 2017-11-21 LAB
C TRACH DNA SPEC QL NAA+PROBE: NEGATIVE
N GONORRHOEA DNA SPEC QL NAA+PROBE: NEGATIVE
SPECIMEN SOURCE: NORMAL
SPECIMEN SOURCE: NORMAL

## 2017-11-21 NOTE — PROGRESS NOTES
"Initial Obstetrics Visit    Subjective: 32 year old  at 10w6d dated by LMP confirmed by 9 week ultrasound here today for initial OB visit. Patient reports mild headaches, nausea, fatigue since finding out she was pregnant. Denies cramping and vaginal spotting.     OB History:   First pregnancy    Gyn History:   Menses: LMP: Patient's last menstrual period was 2017 (exact date).  Sexually transmitted disease history: none.    Exercise:   Diet:Well rounded  Immunizations: Received flu, discussed TDAP at 27+ weeks  H/o Chicken Pox or Varicella Vaccination: Yes    Past Medical History:   Diagnosis Date     Endometriosis      Past Surgical History:   Procedure Laterality Date     LAPAROSCOPY      endometriosis     family history includes Anxiety Disorder in her mother; Breast Cancer (age of onset: 50) in her maternal aunt; Breast Cancer (age of onset: 70) in her paternal grandmother; Depression in her mother; Thyroid Disease in her maternal grandmother and mother.  Social History     Social History     Marital status:      Spouse name: Lance     Number of children: N/A     Years of education: N/A     Occupational History           Social History Main Topics     Smoking status: Never Smoker     Smokeless tobacco: Never Used     Alcohol use No     Drug use: No     Sexual activity: Yes     Partners: Male      Comment: pregnant     Other Topics Concern     Not on file     Social History Narrative     Penicillins    Current Outpatient Prescriptions on File Prior to Visit:  Prenatal Vit-Fe Fumarate-FA (PRENATAL MULTIVITAMIN PLUS IRON) 27-0.8 MG TABS per tablet Take 1 tablet by mouth daily     No current facility-administered medications on file prior to visit.      Review Of Systems: Negative except as mentioned in HPI    Exam:  Vitals: /70 (BP Location: Left arm, Patient Position: Sitting, Cuff Size: Adult Regular)  Pulse 84  Ht 5' 3\" (1.6 m)  Wt 188 lb (85.3 kg)  LMP 2017 " (Exact Date)  Breastfeeding? No  BMI 33.3 kg/m2  BMI= Body mass index is 33.3 kg/(m^2).    Constitutional: Healthy appearing female. No acute distress.  HEENT: Normal appearance. Neck supple, thyroid normal in size without nodularity or masses.  Cardiovascular: Regular rate and rhythm without murmurs, clicks, gallops or rub.  Respiratory: Clear to auscultation bilaterally without crackles or wheeze.  Breast Exam: No visible masses or suspicious skin changes.  No discrete or dominant masses to palpation.  No axillary lymphadenopathy.  Gastrointestinal: Abdomen soft, non-tender. BS normal. No masses or organomegaly.  Pelvic Exam -    Vulva: Normal genitalia   Vagina: Normal mucosa, no abnormal discharge   Cervix: Nulliparous, closed, mobile,  no discharge   Uterus: mildly enlarged    Adnexa: No masses, nodularity, tenderness  Skin: No suspicious lesions or rashes  Psychiatric: Mentation appears normal and affect normal      Lab Results   Component Value Date    HGB 12.0 2017    HGB 13.8 2017       Recent Labs   Lab Test  17   0949   ABO  A   RH  Pos   AS  Neg     Rhogam not indicated     Recent Labs   Lab Test  17   0950   HEPBANG  Nonreactive   HIAGAB  Nonreactive   RUQIGG  31         Assessment: 32 year old  at 10w6d here today for initial prenatal visit. Doing well overall.     1. Encounter for supervision of normal first pregnancy in first trimester  - Non Invasive Prenatal Test Cell Free DNA  - Needs AFP in 2nd trimester    2. History of asthma  - Exercise induced as a child, no current treatment or symptoms    3. H/O abnormal cervical Papanicolaou smear  - Colposcopy in  negative, per patient  - Negative pap smears since that time  - Pap NIL, HPV neg 10/2017 - repeat co-testing in 5 years    4. Screen for STD (sexually transmitted disease)  - NEISSERIA GONORRHOEA PCR  - CHLAMYDIA TRACHOMATIS PCR    - Other/Follow-up: Follow up in 4 weeks..         Nalini Concepcion, DO  Obstetrics  and Gynecology

## 2017-11-30 ENCOUNTER — TELEPHONE (OUTPATIENT)
Dept: OBGYN | Facility: CLINIC | Age: 32
End: 2017-11-30